# Patient Record
Sex: FEMALE | Employment: OTHER | URBAN - METROPOLITAN AREA
[De-identification: names, ages, dates, MRNs, and addresses within clinical notes are randomized per-mention and may not be internally consistent; named-entity substitution may affect disease eponyms.]

---

## 2018-09-17 ENCOUNTER — TRANSFERRED RECORDS (OUTPATIENT)
Dept: HEALTH INFORMATION MANAGEMENT | Facility: CLINIC | Age: 78
End: 2018-09-17

## 2018-09-18 ENCOUNTER — TRANSFERRED RECORDS (OUTPATIENT)
Dept: HEALTH INFORMATION MANAGEMENT | Facility: CLINIC | Age: 78
End: 2018-09-18

## 2018-09-19 ENCOUNTER — PRE VISIT (OUTPATIENT)
Dept: OPHTHALMOLOGY | Facility: CLINIC | Age: 78
End: 2018-09-19

## 2018-09-19 RX ORDER — DABIGATRAN ETEXILATE 75 MG/1
75 CAPSULE ORAL 2 TIMES DAILY
COMMUNITY

## 2018-09-19 RX ORDER — ROSUVASTATIN CALCIUM 10 MG/1
10 TABLET, COATED ORAL DAILY
COMMUNITY

## 2018-09-19 NOTE — TELEPHONE ENCOUNTER
I have attempted to contact this patient by phone with the following results:    Chief Complaint   Patient presents with     Previsit     appt w/ Dr Stevens     Dermatochalasis Evaluation     refered by Dr Aguilar OS>OD     Sadaf TILLMAN. COA. [facility]

## 2018-09-24 NOTE — TELEPHONE ENCOUNTER
For the evaluation of   Chief Complaint   Patient presents with     Previsit     appt w/ Dr Stevens     Dermatochalasis Evaluation     refered by Dr Aguilar OS>OD       When was your last eye exam w/ [facility]?   Do you wear glasses? Yes Please bring them with you to your appointment.  Do you wear contacts? No  How many hours per day to you wear your lenses? not applicable  Please bring the boxes with you to your appointment.      HPI:  Location:   OU     Symptoms:   lid problem drooping  Pertinent Negatives:   Negative for vision changes or eye problems  Severity:   moderate  Duration:   years    Timing:   gradual onset  Other:     POH:  1- s/p CE PC IOL OU  2- Mac degen Ou  3- Exotropia OS  4- Dermatochalasis    Do you use any eye drops?   For what condition(s)?    Ocular Meds:  None       ROS:    Review Of Systems  Eyes: as above  Endocrine:  negative    Allergies:  No Known Allergies    Systemic Medications:   Current Outpatient Prescriptions   Medication     dabigatran ANTICOAGULANT (PRADAXA ANTICOAGULANT) 75 MG capsule     LISINOPRIL PO     Multiple Vitamins-Minerals (ICAPS AREDS FORMULA PO)     rosuvastatin (CRESTOR) 10 MG tablet     No current facility-administered medications for this visit.        Family History   Problem Relation Age of Onset     Hypertension Father      Hypertension Brother      Cancer Brother      Hypertension Sister      Diabetes No family hx of      Glaucoma No family hx of      Macular Degeneration No family hx of        Social History   Substance Use Topics     Smoking status: Never Smoker     Smokeless tobacco: Never Used     Alcohol use Yes      Comment: genny MANZANARES. [facility]

## 2018-09-24 NOTE — TELEPHONE ENCOUNTER
I have attempted to contact this patient by phone with the following results: left message to return my call on answering machine.  Sadaf MATHEW COA. [facility]

## 2018-10-03 ENCOUNTER — OFFICE VISIT (OUTPATIENT)
Dept: OPHTHALMOLOGY | Facility: CLINIC | Age: 78
End: 2018-10-03
Payer: COMMERCIAL

## 2018-10-03 DIAGNOSIS — H50.10 MONOCULAR EXOTROPIA: ICD-10-CM

## 2018-10-03 DIAGNOSIS — H57.813 BROW PTOSIS, BILATERAL: ICD-10-CM

## 2018-10-03 DIAGNOSIS — H02.834 DERMATOCHALASIS OF BOTH UPPER EYELIDS: Primary | ICD-10-CM

## 2018-10-03 DIAGNOSIS — H02.831 DERMATOCHALASIS OF BOTH UPPER EYELIDS: Primary | ICD-10-CM

## 2018-10-03 PROCEDURE — 99203 OFFICE O/P NEW LOW 30 MIN: CPT | Performed by: OPHTHALMOLOGY

## 2018-10-03 PROCEDURE — 92285 EXTERNAL OCULAR PHOTOGRAPHY: CPT | Performed by: OPHTHALMOLOGY

## 2018-10-03 PROCEDURE — 92081 LIMITED VISUAL FIELD XM: CPT | Performed by: OPHTHALMOLOGY

## 2018-10-03 ASSESSMENT — EXTERNAL EXAM - LEFT EYE: OS_EXAM: BROW PTOSIS, WITH FRONTALIS RELAXED, BROW IS BELOW SUPERIOR ORBITAL RIM AND LATERALLY INLINE WITH UPPER LASHES

## 2018-10-03 ASSESSMENT — EXTERNAL EXAM - RIGHT EYE: OD_EXAM: BROW PTOSIS, WITH FRONTALIS RELAXED, BROW IS BELOW SUPERIOR ORBITAL RIM AND LATERALLY INLINE WITH UPPER LASHES

## 2018-10-03 ASSESSMENT — SLIT LAMP EXAM - LIDS
COMMENTS: HEAVY DERMATOCHALASIS RESTING ON LASHES
COMMENTS: HEAVY DERMATOCHALASIS RESTING ON LASHES

## 2018-10-03 ASSESSMENT — VISUAL ACUITY
METHOD: SNELLEN - LINEAR
OD_CC: 20/30
OS_CC: 20/40
CORRECTION_TYPE: GLASSES

## 2018-10-03 NOTE — NURSING NOTE
Patient presents with:  Dermatochalasis Evaluation: referd by Dr Jeff Schumacher      Referring Provider:  [facility]  [id number] [place] [id number]    HPI    Affected eye(s):  Both   Location:  Upper   Symptoms:     Difficulty with reading   Difficulty watching television   Difficulty with driving      Frequency:  Constant, Daily       Do you have eye pain now?:  No      Comments:     Dermatochalasis Evaluation: referd by Dr Jeff MATHEW COA. [facility]

## 2018-10-03 NOTE — LETTER
" 10/3/2018         RE:  :  MRN: Charline Mayo  1940  [id number]     Dear Dr. Baptiste,    Thank you for asking me to see your patient, Charline Mayo, for an oculoplastic   consultation.  My assessment and plan are below.              HPI:     Charline Mayo is a 77 year old female who has noted gradual onset of droopy eyelids over the past years. The droopy eyelid is interfering with activities of daily living including driving, and reading. The patient denies double vision, variability of the eyelid position. She does have dry eye symptoms, and use refresh every morning. History of left \"lazy eye.\" No prior surgery. Also has Age related macular degeneration.  She has had a prior blepharoplasty maybe 20 years ago.       EXAM:     MRD1: 1 ou  Dermatochalasis with excess skin touching eyelashes   Brow ptosis with brow resting below superior orbital rim - laterally and nasally this is a significant contribution to her eyelid position.     VISUAL FIELD:  Right eye untaped:10 degrees Right eye taped:50 degrees  Left eye untaped:0 degrees Left eye taped:55 degrees    Assessment & Plan     Charline Mayo is a 77 year old female with the following diagnoses:   1. Dermatochalasis of both upper eyelids    2. Brow ptosis, bilateral    3. Monocular exotropia       Both upper eyelid blepharoplasty and direct brow (more nasally).     Due to significant brow ptosis, blepharoplasty alone would be unsuccesfull in addressing the lateral hooding which is obstructing vision. Blepharoplasty alone would result in sewing very thin eyelid skin to thick sub-brow skin, and even with that, fail to address lateral hooding which is obstructing vision. Her brow ptosis is a very significant factor in her loss of peripheral vision.    Will need to hold anticoagulants. She has a pacemaker.       ANTICOAGULATION:  Pradaxa and aspirin for Afib.         Again, thank you for allowing me to participate in the care of your patient.      Sincerely,    Hilda" MD Emma  Department of Ophthalmology and Visual Neurosciences  [facility]    CC: Anika Emile  [facility]  [address] [address] [facility] [address]  VIA Mail

## 2018-10-03 NOTE — MR AVS SNAPSHOT
After Visit Summary   10/3/2018    Charline Mayo    MRN: [id number]           Patient Information     Date Of Birth          1940        Visit Information        Provider Department      10/3/2018 7:30 AM Hilda Carter MD [facility]        Today's Diagnoses     Dermatochalasis of both upper eyelids    -  1    Brow ptosis, bilateral        Monocular exotropia           Follow-ups after your visit        Follow-up notes from your care team     Return for Surgery.      Who to contact     If you have questions or need follow up information about today's clinic visit or your schedule please contact [facility] directly at [phone].  Normal or non-critical lab and imaging results will be communicated to you by MyChart, letter or phone within 4 business days after the clinic has received the results. If you do not hear from us within 7 days, please contact the clinic through MyChart or phone. If you have a critical or abnormal lab result, we will notify you by phone as soon as possible.  Submit refill requests through [employer] or call your pharmacy and they will forward the refill request to us. Please allow 3 business days for your refill to be completed.          Additional Information About Your Visit        MyChart Information     [employer] is an electronic gateway that provides easy, online access to your medical records. With [employer], you can request a clinic appointment, read your test results, renew a prescription or communicate with your care team.     To sign up for [employer] visit the website at [email or web address]   You will be asked to enter the access code listed below, as well as some personal information. Please follow the directions to create your username and password.     Your access code is: [email or web address]  Expires: 2019  8:04 AM     Your access code will  in 90 days. If you need help or a new code, please contact your [facility] or call [phone] for assistance.        Care EveryWhere ID     This is your Care EveryWhere ID. This could be used by other organizations to access your [place] medical records  [id number]         Blood Pressure from Last 3 Encounters:   No data found for BP    Weight from Last 3 Encounters:   No data found for Wt              We Performed the Following     External Photos OU (both eyes)     Kinetic Ptosis OU     Nola-Operative Worksheet (Plastics)        Primary Care Provider Office Phone # Fax #    Balaji Casas [phone]       [facility] [address] [address] [facility] [place] [address]        Equal Access to Services     [facility]: Hadii aad ku hadasho Soomaali, waaxda luqadaha, qaybta kaalmada adeegyada, waxay idiin hayaan adeeg joseph ford . So [facility] [phone].    ATENCIÓN: Si habla español, tiene a bee disposición servicios gratuitos de asistencia lingüística. Llame al [phone].    We comply with applicable federal civil rights laws and [place] laws. We do not discriminate on the basis of race, color, national origin, age, disability, sex, sexual orientation, or gender identity.            Thank you!     Thank you for choosing [facility]  for your care. Our goal is always to provide you with excellent care. Hearing back from our patients is one way we can continue to improve our services. Please take a few minutes to complete the written survey that you may receive in the mail after your visit with us. Thank you!             Your Updated Medication List - Protect others around you: Learn how to safely use, store and throw away your medicines at www.disposemymeds.org.          This list is accurate as of 10/3/18  8:04 AM.  Always use your most recent med list.                   Brand Name Dispense Instructions for use Diagnosis    CRESTOR 10 MG tablet   Generic drug:  rosuvastatin      Take 10 mg by mouth daily         ICAPS AREDS FORMULA PO           LISINOPRIL PO           PRADAXA ANTICOAGULANT 75 MG capsule   Generic drug:  dabigatran ANTICOAGULANT      Take 75 mg by mouth 2 times daily Store in original 's bottle or blister pack; use within 120 days of opening.

## 2018-10-03 NOTE — PROGRESS NOTES
"Oculoplastic Clinic New Patient    Patient: Charline Mayo MRN# [id number]   YOB: 1940 Age: 77 year old   Date of Visit: Oct 3, 2018    CC: Droopy eyelids obstructing vision.  Chief Complaints and History of Present Illnesses   Patient presents with     Dermatochalasis Evaluation     referd by Dr Emile Horn [place]                 HPI:     Charline Mayo is a 77 year old female who has noted gradual onset of droopy eyelids over the past years. The droopy eyelid is interfering with activities of daily living including driving, and reading. The patient denies double vision, variability of the eyelid position. She does have dry eye symptoms, and use refresh every morning. History of left \"lazy eye.\" No prior surgery. Also has Age related macular degeneration.  She has had a prior blepharoplasty maybe 20 years ago.       EXAM:     MRD1: 1 ou  Dermatochalasis with excess skin touching eyelashes   Brow ptosis with brow resting below superior orbital rim - laterally and nasally this is a significant contribution to her eyelid position.     VISUAL FIELD:  Right eye untaped:10 degrees Right eye taped:50 degrees  Left eye untaped:0 degrees Left eye taped:55 degrees    Assessment & Plan     Charline Mayo is a 77 year old female with the following diagnoses:   1. Dermatochalasis of both upper eyelids    2. Brow ptosis, bilateral    3. Monocular exotropia       Both upper eyelid blepharoplasty and direct brow (more nasally).     Due to significant brow ptosis, blepharoplasty alone would be unsuccesfull in addressing the lateral hooding which is obstructing vision. Blepharoplasty alone would result in sewing very thin eyelid skin to thick sub-brow skin, and even with that, fail to address lateral hooding which is obstructing vision. Her brow ptosis is a very significant factor in her loss of peripheral vision.    Will need to hold anticoagulants. She has a pacemaker.       ANTICOAGULATION:  Pradaxa and aspirin for Afib.      "     PHOTOS DEMONSTRATE:    Significant dermatochalasis with lids resting on eyelashes and obstructing visual axis  Brow ptosis with thicker brow skin and hairs below the lateral superior orbital rim and obscuring vision    Attending Physician Attestation:  Complete documentation of historical and exam elements from today's encounter can be found in the full encounter summary report (not reduplicated in this progress note).  I personally obtained the chief complaint(s) and history of present illness.  I confirmed and edited as necessary the review of systems, past medical/surgical history, family history, social history, and examination findings as documented by others; and I examined the patient myself.  I personally reviewed the relevant tests, images, and reports as documented above.  I formulated and edited as necessary the assessment and plan and discussed the findings and management plan with the patient and family. - Hilda Carter MD      Today with Charline Mayo, I reviewed the indications, risks, benefits, and alternatives of the proposed surgical procedure including, but not limited to, failure obtain the desired result  and need for additional surgery, bleeding, infection, loss of vision, loss of the eye, and the remote possibility of permanent damage to any organ system or death with the use of anesthesia.  I provided multiple opportunities for the questions, answered all questions to the best of my ability, and confirmed that my answers and my discussion were understood.

## 2018-10-04 ENCOUNTER — TELEPHONE (OUTPATIENT)
Dept: OPHTHALMOLOGY | Facility: CLINIC | Age: 78
End: 2018-10-04

## 2018-10-04 NOTE — TELEPHONE ENCOUNTER
Charline called and stated that due to her age, she spoke at length with her  and family and that they told her it may be best for her to just have the blepharoplasty only done.  They were under the impression that there would be no bruising and less healing time with doing just the one procedure. Explained to Charline that she will bruise no matter what is done, it is the nature of the surgical area. Also since she is on blood thinners she will have bruising. As for the healing time it will not matter it will be the same length of healing.   Explained to her also that her eye brow is hooding her eyes as well as the extra skin. Told her that when the visual field was done yesterday and her extra skin was taped back it was also holding up the eye brow to allow more peripheral vision. It was explained to her again that she can most certainly have just the blepharoplasty done but that the chances of her needed / wanting to come back for that second procedure are higher than if she did them both at the same time.  Pt verbalized understanding of all of the above and decided that she will think about it today and call the clinic back on Monday to let us know what she would like to do.  Sadaf MANZANARES. OSC